# Patient Record
Sex: MALE | URBAN - METROPOLITAN AREA
[De-identification: names, ages, dates, MRNs, and addresses within clinical notes are randomized per-mention and may not be internally consistent; named-entity substitution may affect disease eponyms.]

---

## 2021-02-23 ENCOUNTER — OFFICE VISIT (OUTPATIENT)
Dept: URBAN - METROPOLITAN AREA CLINIC 98 | Facility: CLINIC | Age: 56
End: 2021-02-23

## 2021-03-01 ENCOUNTER — WEB ENCOUNTER (OUTPATIENT)
Dept: URBAN - METROPOLITAN AREA CLINIC 118 | Facility: CLINIC | Age: 56
End: 2021-03-01

## 2021-03-02 ENCOUNTER — OFFICE VISIT (OUTPATIENT)
Dept: URBAN - METROPOLITAN AREA CLINIC 98 | Facility: CLINIC | Age: 56
End: 2021-03-02
Payer: COMMERCIAL

## 2021-03-02 VITALS
SYSTOLIC BLOOD PRESSURE: 128 MMHG | HEART RATE: 78 BPM | TEMPERATURE: 96.5 F | BODY MASS INDEX: 29.85 KG/M2 | HEIGHT: 67 IN | DIASTOLIC BLOOD PRESSURE: 77 MMHG | WEIGHT: 190.2 LBS

## 2021-03-02 DIAGNOSIS — R10.84 GENERALIZED ABDOMINAL PAIN: ICD-10-CM

## 2021-03-02 PROCEDURE — 99203 OFFICE O/P NEW LOW 30 MIN: CPT | Performed by: INTERNAL MEDICINE

## 2021-03-02 NOTE — HPI-OTHER HISTORIES
Bloating generalized abdominal pain after meals Painful BM's had colonoscopy 2015 was having rectal bleeding at the time, nothing was found

## 2021-03-04 ENCOUNTER — OFFICE VISIT (OUTPATIENT)
Dept: URBAN - METROPOLITAN AREA SURGERY CENTER 18 | Facility: SURGERY CENTER | Age: 56
End: 2021-03-04
Payer: COMMERCIAL

## 2021-03-04 ENCOUNTER — TELEPHONE ENCOUNTER (OUTPATIENT)
Dept: URBAN - METROPOLITAN AREA CLINIC 98 | Facility: CLINIC | Age: 56
End: 2021-03-04

## 2021-03-04 DIAGNOSIS — K31.89 ACQUIRED DEFORMITY OF DUODENUM: ICD-10-CM

## 2021-03-04 DIAGNOSIS — K57.32 DIVERTICULITIS LARGE INTESTINE: ICD-10-CM

## 2021-03-04 DIAGNOSIS — K25.9 ANTRAL ULCER: ICD-10-CM

## 2021-03-04 PROCEDURE — G8907 PT DOC NO EVENTS ON DISCHARG: HCPCS | Performed by: INTERNAL MEDICINE

## 2021-03-04 PROCEDURE — 43239 EGD BIOPSY SINGLE/MULTIPLE: CPT | Performed by: INTERNAL MEDICINE

## 2021-03-04 PROCEDURE — 45378 DIAGNOSTIC COLONOSCOPY: CPT | Performed by: INTERNAL MEDICINE

## 2021-03-04 RX ORDER — OMEPRAZOLE 20 MG/1
1 CAPSULE, DELAYED RELEASE ORAL BID
Qty: 60 | Refills: 1 | OUTPATIENT

## 2021-03-04 RX ORDER — METRONIDAZOLE 500 MG/1
1 TABLET TABLET ORAL BID
Qty: 28 TABLET | OUTPATIENT
Start: 2021-03-04 | End: 2021-03-18

## 2021-03-07 ENCOUNTER — TELEPHONE ENCOUNTER (OUTPATIENT)
Dept: URBAN - METROPOLITAN AREA CLINIC 98 | Facility: CLINIC | Age: 56
End: 2021-03-07

## 2021-04-06 ENCOUNTER — OFFICE VISIT (OUTPATIENT)
Dept: URBAN - METROPOLITAN AREA TELEHEALTH 2 | Facility: TELEHEALTH | Age: 56
End: 2021-04-06
Payer: COMMERCIAL

## 2021-04-06 DIAGNOSIS — K40.20 BILATERAL INGUINAL HERNIA WITHOUT OBSTRUCTION OR GANGRENE, RECURRENCE NOT SPECIFIED: ICD-10-CM

## 2021-04-06 DIAGNOSIS — R14.0 BLOATING: ICD-10-CM

## 2021-04-06 DIAGNOSIS — R10.84 GENERALIZED ABDOMINAL PAIN: ICD-10-CM

## 2021-04-06 DIAGNOSIS — K57.30 COLON, DIVERTICULOSIS: ICD-10-CM

## 2021-04-06 PROBLEM — 397881000: Status: ACTIVE | Noted: 2021-04-06

## 2021-04-06 PROCEDURE — 99213 OFFICE O/P EST LOW 20 MIN: CPT | Performed by: INTERNAL MEDICINE

## 2021-04-06 RX ORDER — OMEPRAZOLE 20 MG/1
1 CAPSULE, DELAYED RELEASE ORAL BID
Qty: 60 | Refills: 1 | Status: ACTIVE | COMMUNITY

## 2021-04-06 NOTE — HPI-OTHER HISTORIES
Bloating generalized abdominal pain after meals Painful BM's had colonoscopy 2015 was having rectal bleeding at the time, nothing was found 4/6/21 colonoscopy 3/4/21 diverticulosis EGD 3/4/21 gastric ulcer negative for h pylori CT scan diverticulosis, ? diverticulitis bilat inguinal hernias

## 2021-04-22 ENCOUNTER — LAB OUTSIDE AN ENCOUNTER (OUTPATIENT)
Dept: URBAN - METROPOLITAN AREA CLINIC 118 | Facility: CLINIC | Age: 56
End: 2021-04-22

## 2021-04-27 LAB
DEAMIDATED GLIADIN ABS, IGA: 11
DEAMIDATED GLIADIN ABS, IGG: 2
ENDOMYSIAL ANTIBODY IGA: NEGATIVE
F001-IGE EGG WHITE: <0.1
F002-IGE MILK: <0.1
F003-IGE CODFISH: <0.1
F004-IGE WHEAT: <0.1
F008-IGE CORN: <0.1
F010-IGE SESAME SEED: <0.1
F013-IGE PEANUT: <0.1
F014-IGE SOYBEAN: <0.1
F024-IGE SHRIMP: <0.1
F207-IGE CLAM: <0.1
F256-IGE WALNUT: <0.1
F338-IGE SCALLOP: <0.1
IMMUNOGLOBULIN A, QN, SERUM: 238
Lab: (no result)
T-TRANSGLUTAMINASE (TTG) IGA: <2
T-TRANSGLUTAMINASE (TTG) IGG: 4

## 2023-08-11 ENCOUNTER — TELEPHONE ENCOUNTER (OUTPATIENT)
Dept: URBAN - METROPOLITAN AREA CLINIC 98 | Facility: CLINIC | Age: 58
End: 2023-08-11

## 2023-09-05 ENCOUNTER — OFFICE VISIT (OUTPATIENT)
Dept: URBAN - METROPOLITAN AREA CLINIC 98 | Facility: CLINIC | Age: 58
End: 2023-09-05
Payer: COMMERCIAL

## 2023-09-05 VITALS — HEIGHT: 67 IN

## 2023-09-05 DIAGNOSIS — R14.0 BLOATING: ICD-10-CM

## 2023-09-05 DIAGNOSIS — R10.84 GENERALIZED ABDOMINAL PAIN: ICD-10-CM

## 2023-09-05 DIAGNOSIS — K57.90 DIVERTICULOSIS: ICD-10-CM

## 2023-09-05 PROCEDURE — 99214 OFFICE O/P EST MOD 30 MIN: CPT | Performed by: INTERNAL MEDICINE

## 2023-09-05 RX ORDER — LUBIPROSTONE 8 UG/1
1 CAPSULE WITH FOOD AND WATER CAPSULE, GELATIN COATED ORAL TWICE A DAY
Qty: 60 | OUTPATIENT
Start: 2023-09-05 | End: 2023-10-05

## 2023-09-05 RX ORDER — OMEPRAZOLE 20 MG/1
1 CAPSULE, DELAYED RELEASE ORAL BID
Qty: 60 | Refills: 1 | Status: ON HOLD | COMMUNITY

## 2023-09-05 NOTE — HPI-OTHER HISTORIES
Bloating generalized abdominal pain after meals Painful BM's had colonoscopy 2015 was having rectal bleeding at the time, nothing was found . 4/6/21 colonoscopy 3/4/21 diverticulosis EGD 3/4/21 gastric ulcer negative for h pylori CT scan diverticulosis, ? diverticulitis bilat inguinal hernias. . 9/5/2023 feels bloated after any meal incomplete evacuation  daily BM

## 2023-09-08 ENCOUNTER — TELEPHONE ENCOUNTER (OUTPATIENT)
Dept: URBAN - METROPOLITAN AREA CLINIC 98 | Facility: CLINIC | Age: 58
End: 2023-09-08

## 2023-09-08 RX ORDER — LUBIPROSTONE 8 UG/1
1 CAPSULE WITH FOOD AND WATER CAPSULE, GELATIN COATED ORAL TWICE A DAY
OUTPATIENT
Start: 2023-09-05

## 2023-09-09 LAB
A/G RATIO: 1.6
ALBUMIN: 4
ALKALINE PHOSPHATASE: 54
ALT (SGPT): 24
AST (SGOT): 20
BILIRUBIN, TOTAL: 0.8
BUN/CREATININE RATIO: (no result)
BUN: 18
CALCIUM: 9.1
CARBON DIOXIDE, TOTAL: 27
CHLORIDE: 102
CREATININE: 1.11
EGFR: 78
GLOBULIN, TOTAL: 2.5
GLUCOSE: 102
HEMATOCRIT: 47.4
HEMOGLOBIN: 16.4
LIPASE: 59
MCH: 28.2
MCHC: 34.6
MCV: 81.4
MPV: 10.7
PLATELET COUNT: 211
POTASSIUM: 4.7
PROTEIN, TOTAL: 6.5
RDW: 13.5
RED BLOOD CELL COUNT: 5.82
SODIUM: 138
WHITE BLOOD CELL COUNT: 6.2

## 2023-09-13 ENCOUNTER — TELEPHONE ENCOUNTER (OUTPATIENT)
Dept: URBAN - METROPOLITAN AREA CLINIC 98 | Facility: CLINIC | Age: 58
End: 2023-09-13

## 2023-11-28 ENCOUNTER — CLAIMS CREATED FROM THE CLAIM WINDOW (OUTPATIENT)
Dept: URBAN - NONMETROPOLITAN AREA CLINIC 4 | Facility: CLINIC | Age: 58
End: 2023-11-28
Payer: COMMERCIAL

## 2023-11-28 ENCOUNTER — OFFICE VISIT (OUTPATIENT)
Dept: URBAN - NONMETROPOLITAN AREA CLINIC 4 | Facility: CLINIC | Age: 58
End: 2023-11-28

## 2023-11-28 VITALS
BODY MASS INDEX: 28.6 KG/M2 | HEIGHT: 67 IN | SYSTOLIC BLOOD PRESSURE: 134 MMHG | HEART RATE: 76 BPM | TEMPERATURE: 97.3 F | DIASTOLIC BLOOD PRESSURE: 80 MMHG | WEIGHT: 182.2 LBS

## 2023-11-28 DIAGNOSIS — K57.92 DIVERTICULITIS: ICD-10-CM

## 2023-11-28 DIAGNOSIS — K57.90 DIVERTICULOSIS: ICD-10-CM

## 2023-11-28 DIAGNOSIS — R14.0 BLOATING: ICD-10-CM

## 2023-11-28 DIAGNOSIS — R10.84 GENERALIZED ABDOMINAL PAIN: ICD-10-CM

## 2023-11-28 PROCEDURE — 99244 OFF/OP CNSLTJ NEW/EST MOD 40: CPT | Performed by: PHYSICIAN ASSISTANT

## 2023-11-28 PROCEDURE — 99214 OFFICE O/P EST MOD 30 MIN: CPT | Performed by: PHYSICIAN ASSISTANT

## 2023-11-28 RX ORDER — DOXYCYCLINE HYCLATE 100 MG/1
1 CAPSULE CAPSULE, GELATIN COATED ORAL ONCE A DAY
Qty: 10 | OUTPATIENT
Start: 2023-11-28 | End: 2023-12-08

## 2023-11-28 RX ORDER — LUBIPROSTONE 8 UG/1
1 CAPSULE WITH FOOD AND WATER CAPSULE, GELATIN COATED ORAL TWICE A DAY
Status: DISCONTINUED | COMMUNITY

## 2023-11-28 RX ORDER — DICYCLOMINE HYDROCHLORIDE 20 MG/1
1 TABLET TABLET ORAL THREE TIMES A DAY
Qty: 90 | OUTPATIENT
Start: 2023-11-28 | End: 2023-12-28

## 2023-11-28 RX ORDER — TRAMADOL HYDROCHLORIDE 50 MG/1
1 TABLET AS NEEDED TABLET, FILM COATED ORAL THREE TIMES A DAY
Qty: 30 TABLET | Refills: 0 | OUTPATIENT
Start: 2023-11-28 | End: 2023-12-08

## 2023-11-28 RX ORDER — OMEPRAZOLE 20 MG/1
1 CAPSULE, DELAYED RELEASE ORAL BID
Qty: 60 | Refills: 1 | Status: DISCONTINUED | COMMUNITY

## 2023-11-28 NOTE — HPI-TODAY'S VISIT:
NEELIMA March is here for acute recurrent LLQ abd pain.  Known to us, seen by Dr. Burt in 2021 for the same.'  Cscope with sigmoid tics, with erythema and spasms noted during cscope.  Does endores constipation intermittently, and was previously told to be on a high fiber diet.  Recently to [facility] ER for acute LLQ abd pain CT abd pel  IMPRESSION:*  Acute diverticulitis at the proximal sigmoid colon without evidence of loculated fluid collection/abscess formation.  Took only 4 total days of abx,  by 3-4 days as he states the abx made him feel terrible

## 2023-11-28 NOTE — PHYSICAL EXAM NEUROLOGIC:
oriented to person, place and time, normal sensation, short and long term memory intact, sensory exam intact ,

## 2023-11-28 NOTE — PHYSICAL EXAM GASTROINTESTINAL
Abdomen,  soft, mild supra pubic tenderness.  , nondistended,  normal bowel sounds,  Liver and Spleen,  no hepatomegaly present

## 2023-11-30 ENCOUNTER — TELEPHONE ENCOUNTER (OUTPATIENT)
Dept: URBAN - NONMETROPOLITAN AREA CLINIC 4 | Facility: CLINIC | Age: 58
End: 2023-11-30

## 2023-12-11 ENCOUNTER — OFFICE VISIT (OUTPATIENT)
Dept: URBAN - NONMETROPOLITAN AREA CLINIC 4 | Facility: CLINIC | Age: 58
End: 2023-12-11
Payer: COMMERCIAL

## 2023-12-11 VITALS
WEIGHT: 186 LBS | DIASTOLIC BLOOD PRESSURE: 76 MMHG | TEMPERATURE: 97.7 F | BODY MASS INDEX: 29.19 KG/M2 | SYSTOLIC BLOOD PRESSURE: 127 MMHG | HEART RATE: 68 BPM | HEIGHT: 67 IN

## 2023-12-11 DIAGNOSIS — R14.0 BLOATING: ICD-10-CM

## 2023-12-11 DIAGNOSIS — R10.84 GENERALIZED ABDOMINAL PAIN: ICD-10-CM

## 2023-12-11 DIAGNOSIS — K57.92 DIVERTICULITIS: ICD-10-CM

## 2023-12-11 DIAGNOSIS — K57.90 DIVERTICULOSIS: ICD-10-CM

## 2023-12-11 PROBLEM — 307496006: Status: ACTIVE | Noted: 2023-11-28

## 2023-12-11 PROCEDURE — 99214 OFFICE O/P EST MOD 30 MIN: CPT | Performed by: PHYSICIAN ASSISTANT

## 2023-12-11 RX ORDER — LORATADINE 10 MG
1 PACKET MIXED WITH 8 OUNCES OF FLUID TABLET,DISINTEGRATING ORAL ONCE A DAY
Qty: 30 | OUTPATIENT
Start: 2023-12-11 | End: 2024-01-10

## 2023-12-11 RX ORDER — DICYCLOMINE HYDROCHLORIDE 20 MG/1
1 TABLET TABLET ORAL THREE TIMES A DAY
Qty: 90 | Status: DISCONTINUED | COMMUNITY
Start: 2023-11-28 | End: 2023-12-28

## 2024-03-11 ENCOUNTER — OV EP (OUTPATIENT)
Dept: URBAN - NONMETROPOLITAN AREA CLINIC 4 | Facility: CLINIC | Age: 59
End: 2024-03-11

## 2024-03-11 NOTE — HPI-TODAY'S VISIT:
NEELIMA March is here for acute recurrent LLQ abd pain.  Known to us, seen by Dr. Burt in 2021 for the same.'  Cscope with sigmoid tics, with erythema and spasms noted during cscope.  Does endores constipation intermittently, and was previously told to be on a high fiber diet.  Recently to [facility] ER for acute LLQ abd pain CT abd pel  IMPRESSION:*  Acute diverticulitis at the proximal sigmoid colon without evidence of loculated fluid collection/abscess formation.  Took only 4 total days of abx,  by 3-4 days as he states the abx made him feel terrible  12.11.23 COmpleted full course of Doxy with no issues.  No further abd pain, but still feels incomplete emptying with BMs  Years of constipation, with hesitancy to try any medications as he is nervous about side effects.
